# Patient Record
Sex: FEMALE | Race: WHITE
[De-identification: names, ages, dates, MRNs, and addresses within clinical notes are randomized per-mention and may not be internally consistent; named-entity substitution may affect disease eponyms.]

---

## 2019-12-10 ENCOUNTER — HOSPITAL ENCOUNTER (OUTPATIENT)
Dept: HOSPITAL 84 - D.OPS | Age: 64
Discharge: HOME | End: 2019-12-10
Attending: UROLOGY
Payer: COMMERCIAL

## 2019-12-10 VITALS — WEIGHT: 134.28 LBS | BODY MASS INDEX: 22.93 KG/M2 | HEIGHT: 64 IN

## 2019-12-10 VITALS — SYSTOLIC BLOOD PRESSURE: 142 MMHG | DIASTOLIC BLOOD PRESSURE: 81 MMHG

## 2019-12-10 DIAGNOSIS — K21.9: ICD-10-CM

## 2019-12-10 DIAGNOSIS — R10.32: ICD-10-CM

## 2019-12-10 DIAGNOSIS — N20.1: Primary | ICD-10-CM

## 2019-12-10 LAB
ERYTHROCYTE [DISTWIDTH] IN BLOOD BY AUTOMATED COUNT: 13.1 % (ref 11.5–14.5)
HCT VFR BLD CALC: 33 % (ref 36–48)
HGB BLD-MCNC: 10.8 G/DL (ref 12–16)
MCH RBC QN AUTO: 31 PG (ref 26–34)
MCHC RBC AUTO-ENTMCNC: 32.7 G/DL (ref 31–37)
MCV RBC: 94.8 FL (ref 80–100)
PLATELET # BLD: 428 10X3/UL (ref 130–400)
PMV BLD AUTO: 8.6 FL (ref 7.4–10.4)
RBC # BLD AUTO: 3.48 10X6/UL (ref 4–5.4)
WBC # BLD AUTO: 18.5 10X3/UL (ref 4.8–10.8)

## 2019-12-10 NOTE — NUR
1400 ROUNDS BY DR. TIAN.  IVELISSE DEUTSCH R.N.
1415 DRESSED, AWAKE, & ALERT.  GIVEN DISCHARGE INFORMATION INCLUDING:
MED REC, [facility] D/C INSTRUCTIONS, & POST CYSTOSCOPY & URETERAL STENT
PLACMENT.  PT VOICED UNDERSTANDING.  TO PRIVATE CAR PER WHEELCHAIR BY
STAFF.  HOME WITH GERTRUDIS DEUTSCH R.N.

## 2019-12-10 NOTE — OP
PATIENT NAME:  DEAN PARKER                             MEDICAL RECORD: [id number]
:57                                             LOCATION:D.OPS          
                                                         ADMISSION DATE:        
SURGEON:  CARLOS TIAN MD              
 
 
DATE OF OPERATION:  12/10/2019
 
SURGEON:  Carlos Tian MD
 
ANESTHESIA:  TIVA by Carlos Erickson MD
 
DIAGNOSIS:  A 9 mm left proximal ureteral stone.
 
PROCEDURE:  Cystoscopy, left retrograde pyelogram, left ureteral stent
insertion, 6-French x 26 cm with string attached.
 
FINDINGS:  Radiodense left L3 level of transverse process stone.
 
ESTIMATED BLOOD LOSS:  None.
 
CLINICAL HISTORY:  This is a 63-year-old female with no previous history of
kidney stones.  She does have a history of gout.  She had left flank pain for 20
days now.  She had a CT scan performed, which showed a left proximal ureteral
9-mm stone causing proximal hydroureteronephrosis.  There was a caliceal rupture
in the left kidney.  Because of her history of gout, I thought that perhaps the
stone may be uric acid.  I started her on potassium citrate alkalinization. 
Today, she had a KUB performed.  This shows a radiodense stone at the left L3
transverse process.  Most likely, the stone was of calcium composition.  The
potassium citrate will not dissolve this stone as it was not a uric acid stone. 
She comes to have a left ureteral stent inserted.  As a later date, she will
have left ESWL to treat the stone.  She was given Ancef on call to the OR.
 
DESCRIPTION OF PROCEDURE:  The patient was given IV sedation.  She was then
placed into dorsal lithotomy position and prepped and draped.  Fluoroscopy was
performed and the stone could again be seen at the L3 transverse process. 
Cystoscopy was performed using a 21-French cystoscope with 30-degree lens. 
Single ureteral orifices are seen on each side.  There were no bladder tumors
seen.  The left ureteral orifice was intubated using a 5-French open-ended
ureteral catheter.  Diluted contrast was injected for retrograde pyelogram. 
There was proximal hydroureteronephrosis proximal to the stone.  The stone was
the radiodensity that we saw earlier.  A Sensor wire was then inserted into the
lumen of the open-ended ureteral catheter and it managed to get past the stone
into the renal pelvis.  The open-ended ureteral catheter was then removed
entirely, leaving the wire in place.  Over the wire, we inserted the 6-French x
24 cm ureteral stent.  Once the stent was in correct position, the wire was
withdrawn entirely.  The proximal end was seen to coil properly.  The distal end
was pushed into the bladder using a pusher.  The bladder was then emptied
through the scope sheath and the scope was removed.  The string on the distal
end of the stent was maintained.  It was taped to the suprapubic area with a
piece of Tegaderm.  Once she has had her lithotripsy and her latest followup KUB
shows her to be stone free, then we can remove the stent by pulling on the
string.
 
TRANSINT:[id number] Voice Confirmation ID: [id number] DOCUMENT ID: [id number]
 
 
 
OPERATIVE REPORT                               [id number]    DEAN PARKER ROBERT S MD              
 
 
 
Electronically Signed by CARLOS TIAN on 12/10/19 at 1521
 
 
 
 
 
 
 
 
 
 
 
 
 
 
 
 
 
 
 
 
 
 
 
 
 
 
 
 
 
 
 
 
 
 
 
 
 
 
 
 
 
CC:                                                             [id number]
DICTATION DATE: 12/10/19 1305     :     12/10/19 1420      [facility] 
                                                                      12/10/19
[id number] [place] [address]

## 2019-12-12 ENCOUNTER — HOSPITAL ENCOUNTER (OUTPATIENT)
Dept: HOSPITAL 84 - D.OPS | Age: 64
Discharge: HOME | End: 2019-12-12
Attending: UROLOGY
Payer: COMMERCIAL

## 2019-12-12 VITALS — HEIGHT: 64 IN | BODY MASS INDEX: 22.93 KG/M2 | WEIGHT: 134.28 LBS

## 2019-12-12 VITALS — DIASTOLIC BLOOD PRESSURE: 69 MMHG | SYSTOLIC BLOOD PRESSURE: 129 MMHG

## 2019-12-12 DIAGNOSIS — N13.2: Primary | ICD-10-CM

## 2019-12-12 DIAGNOSIS — M10.9: ICD-10-CM

## 2019-12-12 NOTE — NUR
1255-DISCHARGE CRITERIA MET. REMOVED IV WITH CATH INTACT,DISPOSED
INTO SHARPS,COVERED SITE WITH GUAZE,SECURED WITH MEDIPORE TAPE.
REVIEWED POST OPERATIVE INSTRUCTIONS AND FOLLOW UP.VERBALIZED
UNDERSTANDING. VSS

## 2019-12-12 NOTE — NUR
1300-ESCORTED OUT VIA W/C WITH SPOUSE AWAITING TO DRIVE HOME. HAS
STRAINER,CUP AND DISCHARGE INSTRUCTIONS IN HAND

## 2020-01-03 ENCOUNTER — HOSPITAL ENCOUNTER (OUTPATIENT)
Dept: HOSPITAL 84 - D.RAD | Age: 65
Discharge: HOME | End: 2020-01-03
Attending: UROLOGY
Payer: COMMERCIAL

## 2020-01-03 VITALS — BODY MASS INDEX: 23 KG/M2

## 2020-01-03 DIAGNOSIS — N20.0: Primary | ICD-10-CM

## 2020-01-06 ENCOUNTER — HOSPITAL ENCOUNTER (OUTPATIENT)
Dept: HOSPITAL 84 - D.LABREF | Age: 65
Discharge: HOME | End: 2020-01-06
Attending: UROLOGY
Payer: COMMERCIAL

## 2020-01-06 VITALS — BODY MASS INDEX: 23 KG/M2

## 2020-01-06 DIAGNOSIS — N20.0: Primary | ICD-10-CM

## 2020-01-09 ENCOUNTER — HOSPITAL ENCOUNTER (OUTPATIENT)
Dept: HOSPITAL 84 - D.OPS | Age: 65
Discharge: HOME | End: 2020-01-09
Attending: UROLOGY
Payer: COMMERCIAL

## 2020-01-09 VITALS — WEIGHT: 130 LBS | BODY MASS INDEX: 22.2 KG/M2 | BODY MASS INDEX: 22.2 KG/M2 | HEIGHT: 64 IN

## 2020-01-09 VITALS — SYSTOLIC BLOOD PRESSURE: 133 MMHG | DIASTOLIC BLOOD PRESSURE: 80 MMHG

## 2020-01-09 DIAGNOSIS — N20.0: Primary | ICD-10-CM

## 2020-01-09 DIAGNOSIS — M10.9: ICD-10-CM

## 2020-01-09 LAB
ERYTHROCYTE [DISTWIDTH] IN BLOOD BY AUTOMATED COUNT: 13.7 % (ref 11.5–14.5)
HCT VFR BLD CALC: 38.6 % (ref 36–48)
HGB BLD-MCNC: 12.3 G/DL (ref 12–16)
MCH RBC QN AUTO: 30.4 PG (ref 26–34)
MCHC RBC AUTO-ENTMCNC: 31.9 G/DL (ref 31–37)
MCV RBC: 95.5 FL (ref 80–100)
PLATELET # BLD: 340 10X3/UL (ref 130–400)
PMV BLD AUTO: 9.7 FL (ref 7.4–10.4)
RBC # BLD AUTO: 4.04 10X6/UL (ref 4–5.4)
WBC # BLD AUTO: 7.1 10X3/UL (ref 4.8–10.8)

## 2020-01-10 NOTE — OP
PATIENT NAME:  DEAN PARKER                             MEDICAL RECORD: [id number]
:57                                             LOCATION:D.OPS          
                                                         ADMISSION DATE:        
SURGEON:  JERAD TIAN MD              
 
 
DATE OF OPERATION:  2020
 
SURGEON:  Jerad Tian MD
 
ANESTHESIA:  General anesthesia by Shahab Brandt CRNA
 
DIAGNOSIS:  Left renal stones in the lower pole berenice.
 
PROCEDURE:  Left ESWL times 2000 shocks.
 
FINDINGS:  Radiodense left lower pole stones, the largest is about 5 mm in size.
 
ESTIMATED BLOOD LOSS:  None.
 
CLINICAL HISTORY:  This is a 64-year-old female who initially presented with
acute left flank pain.  A CT scan showed a 9 x 7 mm stone lodged in the left
proximal ureter, which caused hydronephrosis and a forniceal rupture in the
kidney.  She had a left ureteral stent inserted on 12/10/2019.  She then had
left ESWL on 2019.  She has passed some stone fragments, but the followup
KUB showed other stone fragments lodged in the left lower pole of the kidney. 
She comes now to have these remaining stone fragments treated with lithotripsy. 
She is not allergic to any medication.  She was given Ancef on call to the OR.
 
DESCRIPTION OF PROCEDURE:  The patient was given induction of general anesthesia
in supine position.  The stone was targeted in 2 planes.  2000 shocks were given
to the stones and they were seen to have formed into a less dense cloud.  At
this point, we terminated the procedure.  The patient was awakened and brought
to the recovery room.  I will see her in followup in 2 weeks' time with a KUB.
 
TRANSINT:[id number] Voice Confirmation ID: [id number] DOCUMENT ID: [id number]
                                           
                                           JERAD TIAN MD              
 
 
 
Electronically Signed by JERAD TIAN on 01/10/20 at 0927
 
 
 
 
 
 
 
 
CC:                                                             [id number]
DICTATION DATE: 20 1741     :     01/10/20 0426      [facility] 
                                                                      [address] [place] [address]

## 2020-01-22 ENCOUNTER — HOSPITAL ENCOUNTER (OUTPATIENT)
Dept: HOSPITAL 84 - D.RAD | Age: 65
Discharge: HOME | End: 2020-01-22
Attending: UROLOGY
Payer: COMMERCIAL

## 2020-01-22 VITALS — BODY MASS INDEX: 22.3 KG/M2

## 2020-01-22 DIAGNOSIS — N20.0: Primary | ICD-10-CM
